# Patient Record
Sex: FEMALE | Race: ASIAN | URBAN - METROPOLITAN AREA
[De-identification: names, ages, dates, MRNs, and addresses within clinical notes are randomized per-mention and may not be internally consistent; named-entity substitution may affect disease eponyms.]

---

## 2021-03-10 ENCOUNTER — OFFICE VISIT (OUTPATIENT)
Dept: URBAN - METROPOLITAN AREA CLINIC 91 | Facility: CLINIC | Age: 26
End: 2021-03-10
Payer: COMMERCIAL

## 2021-03-10 DIAGNOSIS — H10.023 OTHER MUCOPURULENT CONJUNCTIVITIS, BILATERAL: Primary | ICD-10-CM

## 2021-03-10 DIAGNOSIS — H11.433 CONJUNCTIVAL HYPEREMIA, BILATERAL: ICD-10-CM

## 2021-03-10 PROCEDURE — 99202 OFFICE O/P NEW SF 15 MIN: CPT | Performed by: OPHTHALMOLOGY

## 2021-03-10 RX ORDER — OFLOXACIN 3 MG/ML
0.3 % SOLUTION/ DROPS OPHTHALMIC
Qty: 5 | Refills: 1 | Status: ACTIVE
Start: 2021-03-10

## 2021-03-10 NOTE — IMPRESSION/PLAN
Impression: Other mucopurulent conjunctivitis, bilateral: H10.023. Plan: For conjunctivitis, I have stressed the contagious nature of the infection, and patient should wash their hands frequently in order to avoid spreading to others. I have also given patient Rx for topical antibiotic to use as directed. 

Patient instructed to start ofloxacin TID OU for 7 days, no CTL wear until cleared